# Patient Record
Sex: FEMALE | Race: WHITE | NOT HISPANIC OR LATINO | URBAN - METROPOLITAN AREA
[De-identification: names, ages, dates, MRNs, and addresses within clinical notes are randomized per-mention and may not be internally consistent; named-entity substitution may affect disease eponyms.]

---

## 2021-10-23 ENCOUNTER — INPATIENT (INPATIENT)
Facility: HOSPITAL | Age: 71
LOS: 1 days | Discharge: HOME | End: 2021-10-25
Payer: MEDICARE

## 2021-10-23 PROCEDURE — 72125 CT NECK SPINE W/O DYE: CPT | Mod: 26

## 2021-10-23 PROCEDURE — 93010 ELECTROCARDIOGRAM REPORT: CPT

## 2021-10-23 PROCEDURE — 99285 EMERGENCY DEPT VISIT HI MDM: CPT

## 2021-10-23 PROCEDURE — 71045 X-RAY EXAM CHEST 1 VIEW: CPT | Mod: 26

## 2021-10-23 PROCEDURE — 70450 CT HEAD/BRAIN W/O DYE: CPT | Mod: 26

## 2021-10-23 PROCEDURE — 71260 CT THORAX DX C+: CPT | Mod: 26

## 2021-10-23 PROCEDURE — 74177 CT ABD & PELVIS W/CONTRAST: CPT | Mod: 26

## 2021-10-24 PROCEDURE — 99223 1ST HOSP IP/OBS HIGH 75: CPT

## 2021-10-24 PROCEDURE — 70450 CT HEAD/BRAIN W/O DYE: CPT | Mod: 26

## 2021-10-25 PROCEDURE — 99233 SBSQ HOSP IP/OBS HIGH 50: CPT

## 2022-08-29 ENCOUNTER — APPOINTMENT (OUTPATIENT)
Dept: ORTHOPEDIC SURGERY | Facility: CLINIC | Age: 72
End: 2022-08-29

## 2022-08-29 VITALS — HEIGHT: 64 IN | BODY MASS INDEX: 39.27 KG/M2 | WEIGHT: 230 LBS

## 2022-08-29 DIAGNOSIS — I10 ESSENTIAL (PRIMARY) HYPERTENSION: ICD-10-CM

## 2022-08-29 DIAGNOSIS — Z78.9 OTHER SPECIFIED HEALTH STATUS: ICD-10-CM

## 2022-08-29 DIAGNOSIS — M18.9 OSTEOARTHRITIS OF FIRST CARPOMETACARPAL JOINT, UNSPECIFIED: ICD-10-CM

## 2022-08-29 DIAGNOSIS — E78.00 PURE HYPERCHOLESTEROLEMIA, UNSPECIFIED: ICD-10-CM

## 2022-08-29 DIAGNOSIS — E11.9 TYPE 2 DIABETES MELLITUS W/OUT COMPLICATIONS: ICD-10-CM

## 2022-08-29 PROCEDURE — 73110 X-RAY EXAM OF WRIST: CPT | Mod: LT

## 2022-08-29 PROCEDURE — 99203 OFFICE O/P NEW LOW 30 MIN: CPT

## 2022-08-29 RX ORDER — SIMVASTATIN 80 MG/1
TABLET, FILM COATED ORAL
Refills: 0 | Status: ACTIVE | COMMUNITY

## 2022-08-29 RX ORDER — APIXABAN 5 MG/1
TABLET, FILM COATED ORAL
Refills: 0 | Status: ACTIVE | COMMUNITY

## 2022-08-29 RX ORDER — SITAGLIPTIN 100 MG/1
TABLET, FILM COATED ORAL
Refills: 0 | Status: ACTIVE | COMMUNITY

## 2022-08-29 RX ORDER — CYANOCOBALAMIN/FOLIC AC/VIT B6 1-2.5-25MG
TABLET ORAL
Refills: 0 | Status: ACTIVE | COMMUNITY

## 2022-08-29 RX ORDER — LOSARTAN POTASSIUM 100 MG/1
TABLET, FILM COATED ORAL
Refills: 0 | Status: ACTIVE | COMMUNITY

## 2022-08-29 RX ORDER — RANITIDINE HYDROCHLORIDE 300 MG/1
CAPSULE ORAL
Refills: 0 | Status: ACTIVE | COMMUNITY

## 2022-08-29 RX ORDER — EMPAGLIFLOZIN 25 MG/1
TABLET, FILM COATED ORAL
Refills: 0 | Status: ACTIVE | COMMUNITY

## 2022-08-29 RX ORDER — OXYBUTYNIN CHLORIDE 2.5 MG/1
TABLET ORAL
Refills: 0 | Status: ACTIVE | COMMUNITY

## 2022-08-29 RX ORDER — ICOSAPENT ETHYL 500 MG/1
CAPSULE ORAL
Refills: 0 | Status: ACTIVE | COMMUNITY

## 2022-08-29 NOTE — IMAGING
[de-identified] : Left hand with no swelling, erythema. Able to make fist, oppose thumb to small finger with good thenar bulk, no intrinsic atrophy, - tenderness at radial styloid or A1 pulley. +grind. Stable thumb MCP with tenderness at thumb CMC. Sensation intact throughout. <2sec cap refill.\par \par Left wrist radiographs demonstrate Stage II thumb arthritis.

## 2022-08-29 NOTE — ASSESSMENT
[FreeTextEntry1] : Left thumb CMC arthritis - reviewed radiographs. Discussed treatment ladder including NSAIDs, bracing (shown MetaGrip), hand therapy, CSI and basal joint arthroplasty.\par \par F/u prn (for CSI)

## 2022-08-29 NOTE — HISTORY OF PRESENT ILLNESS
[de-identified] : 71F, RHD, PMHX of DM II, Blood Disorder/Mutation, HTN, HLD presents with left wrist pain with certain ROM. Denies injury/trauma. Denies numbness/tingling. Denies outside imaging/treatment. Admits to pain, for approx. 6 months. Sometimes hears a crack intermittently

## 2023-01-26 ENCOUNTER — OFFICE (OUTPATIENT)
Dept: URBAN - METROPOLITAN AREA CLINIC 103 | Facility: CLINIC | Age: 73
Setting detail: OPHTHALMOLOGY
End: 2023-01-26
Payer: MEDICARE

## 2023-01-26 DIAGNOSIS — H35.033: ICD-10-CM

## 2023-01-26 DIAGNOSIS — H31.101: ICD-10-CM

## 2023-01-26 DIAGNOSIS — H16.223: ICD-10-CM

## 2023-01-26 DIAGNOSIS — H43.812: ICD-10-CM

## 2023-01-26 DIAGNOSIS — E11.9: ICD-10-CM

## 2023-01-26 DIAGNOSIS — H52.7: ICD-10-CM

## 2023-01-26 PROCEDURE — 92014 COMPRE OPH EXAM EST PT 1/>: CPT | Performed by: OPHTHALMOLOGY

## 2023-01-26 PROCEDURE — 92250 FUNDUS PHOTOGRAPHY W/I&R: CPT | Performed by: OPHTHALMOLOGY

## 2023-01-26 PROCEDURE — 92015 DETERMINE REFRACTIVE STATE: CPT | Performed by: OPHTHALMOLOGY

## 2023-01-26 ASSESSMENT — REFRACTION_MANIFEST
OD_VA2: 20/20(J1+)
OS_ADD: +2.50
OD_VA1: 20/30-1
OD_SPHERE: +0.50
OS_AXIS: 089
OD_AXIS: 075
OD_CYLINDER: -0.50
OS_VA1: 20/25
OD_SPHERE: +0.75
OD_AXIS: 090
OS_SPHERE: +1.00
OS_CYLINDER: -1.25
OS_VA2: 20/20(J1+)
OS_SPHERE: +1.25
OD_ADD: +2.25
OS_ADD: +2.25
OD_ADD: +2.50
OD_CYLINDER: -0.75
OS_VA1: 20/25
OS_CYLINDER: -0.75
OD_VA1: 20/30
OS_AXIS: 080

## 2023-01-26 ASSESSMENT — REFRACTION_CURRENTRX
OS_OVR_VA: 20/
OD_OVR_VA: 20/
OS_ADD: +2.50
OD_VPRISM_DIRECTION: PROGS
OS_CYLINDER: -1.25
OD_ADD: +2.50
OD_AXIS: 071
OD_CYLINDER: -0.75
OS_AXIS: 083
OS_VPRISM_DIRECTION: PROGS
OS_SPHERE: +1.25
OD_SPHERE: +0.50

## 2023-01-26 ASSESSMENT — KERATOMETRY
OD_K2POWER_DIOPTERS: 41.75
OD_AXISANGLE_DEGREES: 153
OD_K1POWER_DIOPTERS: 41.25
OS_K2POWER_DIOPTERS: 42.50
OS_K1POWER_DIOPTERS: 41.75
OS_AXISANGLE_DEGREES: 103

## 2023-01-26 ASSESSMENT — AXIALLENGTH_DERIVED
OS_AL: 23.8564
OD_AL: 24.2468
OD_AL: 24.2984
OD_AL: 24.1444

## 2023-01-26 ASSESSMENT — SUPERFICIAL PUNCTATE KERATITIS (SPK)
OD_SPK: T
OS_SPK: T

## 2023-01-26 ASSESSMENT — SPHEQUIV_DERIVED
OS_SPHEQUIV: 0.625
OD_SPHEQUIV: 0.5
OD_SPHEQUIV: 0.125
OD_SPHEQUIV: 0.25
OS_SPHEQUIV: 0.625
OS_SPHEQUIV: 0.625

## 2023-01-26 ASSESSMENT — VISUAL ACUITY
OS_BCVA: 20/30
OD_BCVA: 20/25-2

## 2023-01-26 ASSESSMENT — REFRACTION_AUTOREFRACTION
OD_CYLINDER: -0.50
OS_CYLINDER: -0.75
OD_SPHERE: +0.50
OS_SPHERE: +1.00
OD_AXIS: 084
OS_AXIS: 079

## 2023-01-26 ASSESSMENT — TONOMETRY
OS_IOP_MMHG: 16
OD_IOP_MMHG: 15

## 2023-01-26 ASSESSMENT — CONFRONTATIONAL VISUAL FIELD TEST (CVF)
OD_FINDINGS: FULL
OS_FINDINGS: FULL

## 2023-05-02 PROBLEM — H44.23 MYOPIC DEGENERATION; BOTH EYES: Status: ACTIVE | Noted: 2023-05-02

## 2023-06-23 ENCOUNTER — APPOINTMENT (OUTPATIENT)
Dept: ORTHOPEDIC SURGERY | Facility: CLINIC | Age: 73
End: 2023-06-23
Payer: MEDICARE

## 2023-06-23 VITALS — HEIGHT: 64 IN | WEIGHT: 230 LBS | BODY MASS INDEX: 39.27 KG/M2

## 2023-06-23 PROCEDURE — 99214 OFFICE O/P EST MOD 30 MIN: CPT | Mod: 25

## 2023-06-23 PROCEDURE — 20610 DRAIN/INJ JOINT/BURSA W/O US: CPT | Mod: LT

## 2023-06-23 PROCEDURE — 73030 X-RAY EXAM OF SHOULDER: CPT | Mod: LT

## 2023-06-23 NOTE — DISCUSSION/SUMMARY
[de-identified] : (Assessment and Plan)\par \par History, clinical examination and imaging were most consistent with:\par -left proximal humerus fracture with malunion\par \par The diagnosis was explained in detail. The potential non-surgical and surgical treatments were reviewed. The relative risks and benefits of each option were considered relative to the patient’s age, activity level, medical history, symptom severity and previously attempted treatments. \par \par The patient was advised to consult with their primary medical provider prior to initiation of any new medications to reduce the risk of adverse effects specific to their long-term home medications and medical history. The risk of gastrointestinal irritation and kidney injury specific to long-term NSAID use was discussed.   \par \par -Patient will continue with formal PT as it has been helping. \par -Cortisone injection performed today for symptom relief.\par -Over the counter tylenol as needed for pain. \par -Discussed the only definitive treatment for this problem is a shoulder replacemnt. patient would like to avoid surgery if possible. \par -Follow up in 2 months. Consider additional PT versus surgical discussion based on symptom progression. \par \par \par (MDM) \par \par Problem Complexity \par -Moderate: acute, complicated injury\par \par Risk\par -Moderate: injection\par \par -Patient has not been seen by another provider in my practice within the past 2 years who specializes in orthopedic sports medicine, shoulder or elbow surgery. \par \par (Procedure Note)\par \par Injection location was the:\par -left glenohumeral joint and subacromial bursa\par \par Injection contents were: \par 40 mg of kenalog and 5 mL of 1% lidocaine\par \par Procedure Details: \par -Informed consent was obtained. Discussed possible risks of corticosteroid injection including hyperglycemia, infection and skin discoloration. \par -Discussed that due to infection risk, an interval between injection and any future surgery is 6 weeks for arthroscopy and 3 months for arthroplasty.\par -Injection performed using aseptic technique. Hemostasis was confirmed.\par -Procedure was tolerated well with no complications. \par \par

## 2023-06-23 NOTE — IMAGING
[de-identified] : (Physical Exam: Shoulder)\par \par Laterality is left\par \par Patient is in no acute distress, alert and oriented\par Sensation is grossly intact to light touch in the hand\par Motor function is 5/5 in the hand\par Capillary refill is less than 2 seconds in the fingers\par Lymphadenopathy is not present\par Peripheral edema is not present\par \par Skin is intact \par Swelling is not present \par Atrophy is not present\par Scapular winging is not present\par Deformity of the AC joint is not present\par Deformity of the biceps is not present \par \par Bicipital groove tenderness is not present \par AC joint tenderness is not present \par Scapulothoracic tenderness is not present \par Cervical paraspinal tenderness is not present \par \par Active forward elevation is 80\par Passive forward elevation is 110\par External rotation at the side is 30\par Internal rotation behind the back is to the level of side pocket\par \par Forward elevation strength is 4/5\par External rotation strength at the side is 4/5 \par Internal rotation strength at the side is 4/5 \par Deltoid strength of anterior, posterior and lateral heads is 5/5\par \par Keys test is abnormal \par O’Jose Luis’s test is normal\par Speed’s test is normal\par Cross body adduction test is normal\par Apprehension and relocation is normal\par Sulcus sign is normal\par Belly press test is normal\par Spurling’s test is normal\par  [Left] : left shoulder [FreeTextEntry1] : left proximal humerus malunion with healed fracture, varus alignment of the head fragment, superior migation of greater tuberosity, minimal GH OA, no dislocation

## 2023-06-26 ENCOUNTER — FORM ENCOUNTER (OUTPATIENT)
Age: 73
End: 2023-06-26

## 2023-07-20 ENCOUNTER — FORM ENCOUNTER (OUTPATIENT)
Age: 73
End: 2023-07-20

## 2023-07-27 ENCOUNTER — OFFICE (OUTPATIENT)
Dept: URBAN - METROPOLITAN AREA CLINIC 103 | Facility: CLINIC | Age: 73
Setting detail: OPHTHALMOLOGY
End: 2023-07-27
Payer: MEDICARE

## 2023-07-27 DIAGNOSIS — H52.7: ICD-10-CM

## 2023-07-27 DIAGNOSIS — H16.223: ICD-10-CM

## 2023-07-27 DIAGNOSIS — E11.9: ICD-10-CM

## 2023-07-27 PROCEDURE — 92134 CPTRZ OPH DX IMG PST SGM RTA: CPT | Performed by: OPHTHALMOLOGY

## 2023-07-27 PROCEDURE — 92012 INTRM OPH EXAM EST PATIENT: CPT | Performed by: OPHTHALMOLOGY

## 2023-07-27 PROCEDURE — 92015 DETERMINE REFRACTIVE STATE: CPT | Performed by: OPHTHALMOLOGY

## 2023-07-27 ASSESSMENT — REFRACTION_MANIFEST
OS_ADD: +2.50
OS_AXIS: 089
OS_VA1: 20/20
OD_CYLINDER: -0.75
OS_SPHERE: +1.25
OD_CYLINDER: -0.50
OS_CYLINDER: -1.75
OD_ADD: +2.50
OS_CYLINDER: -0.75
OD_AXIS: 090
OD_SPHERE: +0.75
OS_AXIS: 077
OD_ADD: +2.50
OD_VA1: 20/25
OS_SPHERE: +1.00
OS_VA1: 20/25
OD_AXIS: 090
OD_VA1: 20/30
OD_SPHERE: +0.75
OS_ADD: +2.50

## 2023-07-27 ASSESSMENT — SPHEQUIV_DERIVED
OD_SPHEQUIV: 0.75
OS_SPHEQUIV: 0.625
OD_SPHEQUIV: 0.375
OS_SPHEQUIV: 0.375
OS_SPHEQUIV: 0.875
OD_SPHEQUIV: 0.5

## 2023-07-27 ASSESSMENT — AXIALLENGTH_DERIVED
OS_AL: 24.0515
OD_AL: 24.0009
OS_AL: 23.8507
OD_AL: 23.9006
OD_AL: 24.0515
OS_AL: 23.9507

## 2023-07-27 ASSESSMENT — REFRACTION_CURRENTRX
OS_SPHERE: +1.25
OD_VPRISM_DIRECTION: PROGS
OS_VPRISM_DIRECTION: PROGS
OD_SPHERE: +0.50
OS_ADD: +2.00
OD_AXIS: 076
OS_AXIS: 084
OD_ADD: +2.00
OD_OVR_VA: 20/
OD_CYLINDER: -0.75
OS_CYLINDER: -1.25
OS_OVR_VA: 20/

## 2023-07-27 ASSESSMENT — REFRACTION_AUTOREFRACTION
OS_CYLINDER: -1.75
OD_AXIS: 095
OS_AXIS: 088
OD_CYLINDER: -1.00
OS_SPHERE: +1.75
OD_SPHERE: +1.25

## 2023-07-27 ASSESSMENT — KERATOMETRY
OS_K2POWER_DIOPTERS: 42.00
OD_K2POWER_DIOPTERS: 42.00
OS_K1POWER_DIOPTERS: 41.75
OD_AXISANGLE_DEGREES: 134
OD_K1POWER_DIOPTERS: 41.75
OS_AXISANGLE_DEGREES: 179

## 2023-07-27 ASSESSMENT — CONFRONTATIONAL VISUAL FIELD TEST (CVF)
OD_FINDINGS: FULL
OS_FINDINGS: FULL

## 2023-07-27 ASSESSMENT — VISUAL ACUITY
OS_BCVA: 20/30-
OD_BCVA: 20/25-2

## 2023-07-27 ASSESSMENT — SUPERFICIAL PUNCTATE KERATITIS (SPK)
OS_SPK: 1+
OD_SPK: 1+

## 2023-07-27 ASSESSMENT — TONOMETRY
OD_IOP_MMHG: 15
OS_IOP_MMHG: 11

## 2023-08-18 ENCOUNTER — APPOINTMENT (OUTPATIENT)
Dept: ORTHOPEDIC SURGERY | Facility: CLINIC | Age: 73
End: 2023-08-18
Payer: MEDICARE

## 2023-08-18 DIAGNOSIS — S42.202P UNSPECIFIED FRACTURE OF UPPER END OF LEFT HUMERUS, SUBSEQUENT ENCOUNTER FOR FRACTURE WITH MALUNION: ICD-10-CM

## 2023-08-18 PROCEDURE — 99213 OFFICE O/P EST LOW 20 MIN: CPT

## 2023-08-18 NOTE — IMAGING
[Left] : left shoulder [de-identified] : (Physical Exam: Shoulder)  Laterality is left  Patient is in no acute distress, alert and oriented Sensation is grossly intact to light touch in the hand Motor function is 5/5 in the hand Capillary refill is less than 2 seconds in the fingers Lymphadenopathy is not present Peripheral edema is not present  Skin is intact  Swelling is not present  Atrophy is not present Scapular winging is not present Deformity of the AC joint is not present Deformity of the biceps is not present   Bicipital groove tenderness is not present  AC joint tenderness is not present  Scapulothoracic tenderness is not present  Cervical paraspinal tenderness is not present   Active forward elevation is 100 Passive forward elevation is 130 External rotation at the side is 30 Internal rotation behind the back is to the level of sacrum  Forward elevation strength is 5-/5 External rotation strength at the side is 5-/5  Internal rotation strength at the side is 5-/5  Deltoid strength of anterior, posterior and lateral heads is 5/5  Keys test is abnormal  Canadian's test is normal Speed's test is normal Cross body adduction test is normal Apprehension and relocation is normal Sulcus sign is normal Belly press test is normal Spurling's test is normal  [FreeTextEntry1] : left proximal humerus malunion with healed fracture, varus alignment of the head fragment, superior migation of greater tuberosity, minimal GH OA, no dislocation

## 2023-08-18 NOTE — HISTORY OF PRESENT ILLNESS
[de-identified] : (History of Present Illness) Patient age in years is 72  Occupation is  retired   Body part causing symptoms is the LT shoulder  Symptoms Feb 17th 2023 when she fell and sustained a proximal humerus fracture She was seen by an orthopedist at [facility], treated non-surgically She has been treated with PT She reports gradual improvement with therapy Location of pain is lateral  Quality of pain is  dull  Pain score at rest is  4/10 Pain score during activity is  8/10 Radicular symptoms are not present  Patient's condition is not associated with worker's compensation, no-fault or interscholastic athletics   8/18 f/u: Patient reports she has been improving with PT. She states the CSI helped partially with the pain. She would like to continue with PT.

## 2023-08-18 NOTE — DISCUSSION/SUMMARY
[de-identified] : (Assessment and Plan)  History, clinical examination and imaging were most consistent with: -left proximal humerus fracture with malunion  The diagnosis was explained in detail. The potential non-surgical and surgical treatments were reviewed. The relative risks and benefits of each option were considered relative to the patient's age, activity level, medical history, symptom severity and previously attempted treatments.   The patient was advised to consult with their primary medical provider prior to initiation of any new medications to reduce the risk of adverse effects specific to their long-term home medications and medical history. The risk of gastrointestinal irritation and kidney injury specific to long-term NSAID use was discussed.     -Patient has been improving with non-surgical treatment and would like to continue with PT. -Over the counter tylenol as needed for pain.  -Discussed the only definitive treatment for this problem is a shoulder replacemnt. patient would like to avoid surgery if possible due to other medical problems including blood clots and diabetes, as well as cane use.  -Follow up as needed, consider repeat injection if pain worsens.    (MDM)   Problem Complexity  -Moderate: acute, complicated injury

## 2023-08-24 ENCOUNTER — APPOINTMENT (OUTPATIENT)
Dept: ORTHOPEDIC SURGERY | Facility: CLINIC | Age: 73
End: 2023-08-24
Payer: MEDICARE

## 2023-08-24 VITALS — WEIGHT: 230 LBS | BODY MASS INDEX: 39.27 KG/M2 | HEIGHT: 64 IN

## 2023-08-24 PROCEDURE — 99204 OFFICE O/P NEW MOD 45 MIN: CPT | Mod: 25

## 2023-08-24 PROCEDURE — 20610 DRAIN/INJ JOINT/BURSA W/O US: CPT | Mod: RT

## 2023-08-24 PROCEDURE — 73564 X-RAY EXAM KNEE 4 OR MORE: CPT | Mod: RT

## 2023-08-24 PROCEDURE — 99214 OFFICE O/P EST MOD 30 MIN: CPT | Mod: 25

## 2023-08-24 NOTE — PHYSICAL EXAM
[Right] : right knee [5___] : hamstring 5[unfilled]/5 [] : ambulation with cane [FreeTextEntry3] : decreasing sensation stocking dis of foot  [de-identified] : seen dr. casarez for LSP  [TWNoteComboBox7] : flexion 100 degrees [de-identified] : extension 0 degrees

## 2023-08-24 NOTE — HISTORY OF PRESENT ILLNESS
[de-identified] : Date of Injury/Onset: chronic, years of issues      Pain: At Rest:  0/10    With Activity:  7/10  Affecting Sleep: Cramp when moving in her bed Difficulty with stairs: yes Difficulty getting in and out of car: yes Sit to stand stiffness: yes Mechanism of injury: No known injury, chronic pain This is not a Work-Related Injury being treated under Worker's Compensation. This is not  an athletic injury occurring associated with an interscholastic or organized sports team. Quality of symptoms: dull Improves with: rest, sitting down    Worse with: weight bearing Previous Treatment/Imaging/Studies Since Last Visit: none Reports Available For Review Today: none Out of work/sport:  Retired School/Sport/Position/Occupation: Retired  Reports difficulty bearing weight. yes Partial tear of meniscus and Hx of Cortisone shots in her right knee. Patient feels like her knee will "give out" on her. Currently ambulating with a cane

## 2023-08-24 NOTE — REASON FOR VISIT
[FreeTextEntry2] : Patient here for dull right knee pain, associated with some swelling. Currently ambulating with cane.

## 2023-08-24 NOTE — DISCUSSION/SUMMARY
[de-identified] : Large joint injection was performed of the right knee . Injection # 1    The indication for this procedure was pain and inflammation. The site was prepped with betadine and sterile technique used. Patient tolerated procedure well. Patient was advised to call if redness, pain or fever occur, apply ice for 15 minutes out of every hour for the next 12-24 hours as tolerated and patient was advised to rest the joint(s) for 1 days.    Injection:  Glesyn Series 16.8mg/ml injection     Patient has tried OTC's including aspirin, Ibuprofen, Aleve, etc or prescription NSAIDS, and/or exercises at home and/or physical therapy without satisfactory response, patient had decreased mobility in the joint and the risks benefits, and alternatives have been discussed, and verbal consent was obtained.    Patient will f/u 1 week for next injection

## 2023-08-24 NOTE — PROCEDURE
[Large Joint Injection] : Large joint injection [Knee] : knee [Pain] : pain [Inflammation] : inflammation [Betadine] : betadine [Gel-Syn (16.8mg)] : 16.8mg of Gel-Syn [#1] : series #1

## 2023-08-30 ENCOUNTER — APPOINTMENT (OUTPATIENT)
Dept: ORTHOPEDIC SURGERY | Facility: CLINIC | Age: 73
End: 2023-08-30
Payer: MEDICARE

## 2023-08-30 PROCEDURE — 99213 OFFICE O/P EST LOW 20 MIN: CPT | Mod: 25

## 2023-08-30 PROCEDURE — 72100 X-RAY EXAM L-S SPINE 2/3 VWS: CPT

## 2023-08-30 NOTE — PHYSICAL EXAM
[TextEntry] : Constitutional: Well groomed and developed.  Respiratory: Normal, unlabored breathing. No use of accessory muscles.  Skin: No rashes or ulcers. Skin warm and dry.  Psychiatric: Oriented to time, place, person and event. No acute distress. Gait: Heel to toe Patient able to walk on toes and heels.    Lumbar spine:  Posture: Normal on coronal and sagittal alignment  AROM:  Flexion 60  Extension 10  Moderate pain with simulated truncal motion   Tenderness:  Thoracic: No tenderness on palpation  Lumbar: Moderate tenderness on palpation   TTP L4 verterbrae  Sacrum/coccyx: no tenderness on palpation  Greater trochanteric bursa:  No tenderness  PSIS: None    Motor:                         R             L LE:                                IS                    5             5 Quad              5             5 TA                  5             5 EHL                5             5 Gastroc         5             5                 R  L DTR: Patella  2+  2+ Achilles  2+  2+  Sensory: Light touch sensation intact T12-S1  Babinski's Sign: Negative bilaterally  Straight leg raise test: Negative bilaterally  LIOR test: negative bilaterally

## 2023-08-30 NOTE — ASSESSMENT
[FreeTextEntry1] : Patient given prescription for [facility] MRI, follow up after study is completed to discuss results.  Recommend: - NSAID - Heating pad - Muscle relaxer - Core strengthening exercise - Hamstring stretching exercise Patient is given back rehabilitation exercise book.  Patient will begin physical therapy.

## 2023-08-30 NOTE — REASON FOR VISIT
[FreeTextEntry2] : Lower back pain ongoing for years- states she fell after an episode of syncope 10/2022

## 2023-08-30 NOTE — HISTORY OF PRESENT ILLNESS
[de-identified] : Patient presents today with lower back pain ongoing for years Pt states she fell after an episode of syncope 10/2022- had MRI per hematologist of L spine at [facility] (?)  Pt reports intermittent pain in middle and bilat sides of back- worse when bending and rising to a standing position.  Pt reports weakness in bilat legs- worse in the right and worse with prolonged sitting.  Denies pain, numbness or tingling in LEs.  Reports some relief with Tylenol.  Pt reports seeing Dr Guzman for PM- had a procedure~2/2023 with improvement- reports pain was constant and is now intermittent.

## 2023-09-01 ENCOUNTER — APPOINTMENT (OUTPATIENT)
Dept: ORTHOPEDIC SURGERY | Facility: CLINIC | Age: 73
End: 2023-09-01
Payer: MEDICARE

## 2023-09-01 PROCEDURE — 20610 DRAIN/INJ JOINT/BURSA W/O US: CPT | Mod: RT

## 2023-09-01 NOTE — PHYSICAL EXAM
[Right] : right knee [5___] : hamstring 5[unfilled]/5 [] : ambulation with cane [FreeTextEntry3] : decreasing sensation stocking dis of foot  [de-identified] : seen dr. casarez for LSP  [TWNoteComboBox7] : flexion 100 degrees [de-identified] : extension 0 degrees

## 2023-09-01 NOTE — DISCUSSION/SUMMARY
[de-identified] : Patient here for Gelsyn #2. States minor improvement with first shot.  Follow up 1 week for Gelsyn #3.

## 2023-09-01 NOTE — PROCEDURE
[Large Joint Injection] : Large joint injection [Knee] : knee [Pain] : pain [Inflammation] : inflammation [Betadine] : betadine [Gel-Syn (16.8mg)] : 16.8mg of Gel-Syn [#2] : series #2

## 2023-09-08 ENCOUNTER — APPOINTMENT (OUTPATIENT)
Dept: ORTHOPEDIC SURGERY | Facility: CLINIC | Age: 73
End: 2023-09-08
Payer: MEDICARE

## 2023-09-08 PROCEDURE — 20610 DRAIN/INJ JOINT/BURSA W/O US: CPT | Mod: RT

## 2023-09-08 NOTE — DISCUSSION/SUMMARY
[de-identified] : Large joint injection was performed bilaterally of the knee. The indication for this procedure was pain, inflammation  of Osteoarthritis on this or prior visit. The site was prepped with betadine, ethyl chloride sprayed topically and sterile technique used. An injection of 16.8mg of Gel-Syn, series #3 was used.  Patient tolerated procedure well. Patient was advised to call if redness, pain or fever occur and apply ice for 15 minutes out of every hour for the next 12-24 hours as tolerated.    Patient has tried OTC's including aspirin, Ibuprofen, Aleve, etc or prescription NSAIDS, and/or exercises at home and/or physical therapy without satisfactory response, patient had decreased mobility in the joint and the risks benefits, and alternatives have been discussed, and verbal consent was obtained.  f/u PRN

## 2023-09-08 NOTE — PHYSICAL EXAM
[Right] : right knee [5___] : hamstring 5[unfilled]/5 [] : ambulation with cane [FreeTextEntry3] : decreasing sensation stocking dis of foot  [de-identified] : seen dr. casarez for LSP  [TWNoteComboBox7] : flexion 100 degrees [de-identified] : extension 0 degrees

## 2023-09-08 NOTE — PROCEDURE
[Large Joint Injection] : Large joint injection [Knee] : knee [Pain] : pain [Inflammation] : inflammation [Betadine] : betadine [Gel-Syn (16.8mg)] : 16.8mg of Gel-Syn [#3] : series #3

## 2023-09-09 ENCOUNTER — RESULT REVIEW (OUTPATIENT)
Age: 73
End: 2023-09-09

## 2023-09-22 ENCOUNTER — APPOINTMENT (OUTPATIENT)
Dept: ORTHOPEDIC SURGERY | Facility: CLINIC | Age: 73
End: 2023-09-22
Payer: MEDICARE

## 2023-09-22 DIAGNOSIS — M54.16 RADICULOPATHY, LUMBAR REGION: ICD-10-CM

## 2023-09-22 DIAGNOSIS — M51.37 OTHER INTERVERTEBRAL DISC DEGENERATION, LUMBOSACRAL REGION: ICD-10-CM

## 2023-09-22 DIAGNOSIS — M51.26 OTHER INTERVERTEBRAL DISC DISPLACEMENT, LUMBAR REGION: ICD-10-CM

## 2023-09-22 DIAGNOSIS — M47.817 SPONDYLOSIS W/OUT MYELOPATHY OR RADICULOPATHY, LUMBOSACRAL REGION: ICD-10-CM

## 2023-09-22 DIAGNOSIS — M51.36 OTHER INTERVERTEBRAL DISC DEGENERATION, LUMBAR REGION: ICD-10-CM

## 2023-09-22 DIAGNOSIS — S32.040A WEDGE COMPRESSION FRACTURE OF FOURTH LUMBAR VERTEBRA, INITIAL ENCOUNTER FOR CLOSED FRACTURE: ICD-10-CM

## 2023-09-22 PROCEDURE — 99214 OFFICE O/P EST MOD 30 MIN: CPT

## 2023-11-16 ENCOUNTER — APPOINTMENT (OUTPATIENT)
Dept: ORTHOPEDIC SURGERY | Facility: CLINIC | Age: 73
End: 2023-11-16

## 2023-11-28 PROBLEM — H26.491 POSTERIOR CAPSULAR OPACIFICATION; RIGHT EYE: Status: ACTIVE | Noted: 2023-11-28

## 2024-01-31 ENCOUNTER — OFFICE (OUTPATIENT)
Dept: URBAN - METROPOLITAN AREA CLINIC 103 | Facility: CLINIC | Age: 74
Setting detail: OPHTHALMOLOGY
End: 2024-01-31
Payer: MEDICARE

## 2024-01-31 DIAGNOSIS — H43.812: ICD-10-CM

## 2024-01-31 DIAGNOSIS — H35.3111: ICD-10-CM

## 2024-01-31 DIAGNOSIS — H02.834: ICD-10-CM

## 2024-01-31 DIAGNOSIS — H31.101: ICD-10-CM

## 2024-01-31 DIAGNOSIS — H16.223: ICD-10-CM

## 2024-01-31 DIAGNOSIS — H35.033: ICD-10-CM

## 2024-01-31 DIAGNOSIS — E11.3293: ICD-10-CM

## 2024-01-31 DIAGNOSIS — H43.393: ICD-10-CM

## 2024-01-31 DIAGNOSIS — H02.831: ICD-10-CM

## 2024-01-31 PROCEDURE — 92014 COMPRE OPH EXAM EST PT 1/>: CPT | Performed by: OPHTHALMOLOGY

## 2024-01-31 PROCEDURE — 92250 FUNDUS PHOTOGRAPHY W/I&R: CPT | Performed by: OPHTHALMOLOGY

## 2024-01-31 ASSESSMENT — REFRACTION_MANIFEST
OS_SPHERE: +1.00
OS_ADD: +2.50
OS_VA1: 20/20
OD_VA1: 20/30
OS_CYLINDER: -0.75
OS_CYLINDER: -1.75
OS_VA1: 20/25
OD_AXIS: 090
OD_CYLINDER: -0.75
OD_ADD: +2.50
OD_ADD: +2.50
OD_SPHERE: +0.75
OS_AXIS: 077
OS_ADD: +2.50
OD_CYLINDER: -0.50
OD_VA1: 20/25
OS_SPHERE: +1.25
OS_AXIS: 089
OD_AXIS: 090
OD_SPHERE: +0.75

## 2024-01-31 ASSESSMENT — LID POSITION - DERMATOCHALASIS
OD_DERMATOCHALASIS: RUL 2+ 3+
OS_DERMATOCHALASIS: LUL 1+

## 2024-01-31 ASSESSMENT — REFRACTION_CURRENTRX
OS_SPHERE: +1.25
OD_VPRISM_DIRECTION: PROGS
OS_ADD: +2.00
OD_OVR_VA: 20/
OD_SPHERE: +0.50
OD_CYLINDER: -0.75
OS_CYLINDER: -1.25
OD_AXIS: 076
OD_ADD: +2.00
OS_OVR_VA: 20/
OS_AXIS: 084
OS_VPRISM_DIRECTION: PROGS

## 2024-01-31 ASSESSMENT — CONFRONTATIONAL VISUAL FIELD TEST (CVF)
OD_FINDINGS: FULL
OS_FINDINGS: FULL

## 2024-01-31 ASSESSMENT — SPHEQUIV_DERIVED
OS_SPHEQUIV: 0.625
OD_SPHEQUIV: 0.375
OD_SPHEQUIV: 0.5
OS_SPHEQUIV: 0.375

## 2024-05-21 PROBLEM — H26.491 POSTERIOR CAPSULAR OPACIFICATION; RIGHT EYE: Status: ACTIVE | Noted: 2024-05-21

## 2024-05-23 ENCOUNTER — APPOINTMENT (OUTPATIENT)
Dept: ORTHOPEDIC SURGERY | Facility: CLINIC | Age: 74
End: 2024-05-23
Payer: MEDICARE

## 2024-05-23 VITALS — BODY MASS INDEX: 39.27 KG/M2 | HEIGHT: 64 IN | WEIGHT: 230 LBS

## 2024-05-23 DIAGNOSIS — M48.061 SPINAL STENOSIS, LUMBAR REGION WITHOUT NEUROGENIC CLAUDICATION: ICD-10-CM

## 2024-05-23 PROCEDURE — 99214 OFFICE O/P EST MOD 30 MIN: CPT | Mod: 25

## 2024-05-23 PROCEDURE — 20610 DRAIN/INJ JOINT/BURSA W/O US: CPT | Mod: RT

## 2024-05-23 NOTE — REASON FOR VISIT
[FreeTextEntry2] : f/u for right knee. Gel inj lasted for 5 months. Having clicked, anterior/posterior sharp pain. Ambulating with a cane. Not taking pain medication. 20 years ago Dx with meniscus tear CSI and PT did OK night pain, clicking and slipping instability feeling. going to [facility] rehab 2 stents placed since last visit spinal stenosis Dr Elizabeth left shoulder fracture dislocation  Dr Tracey

## 2024-05-23 NOTE — DISCUSSION/SUMMARY
[de-identified] : Lengthy discussion regarding options was had with the patient. Nonsurgical options including but not limited to cortisone, Visco supplementation, Prescription anti-inflammatory medications (both steroidal and non-steroidal), activity modification, non-impact exercise, maintaining a healthy BMI, bracing, and icing were reviewed. Surgical options including but not limited to arthroscopy, and joint replacement were discussed as was risks, benefits and alternatives.  The patient was advised of the diagnosis. The natural history of the pathology was explained in full to the patient in layman's terms. Several different treatment options were discussed and explained in full to the patient including the risks and benefits of both surgical and non-surgical treatments. All questions and concerns were answered.  I spent time going in detail the problem and the associated risks/benefits of surgery. Went into detailed discussion of complications including but not limited to, nerve injury, non-union, repeat fracture, DVT /PE /pe postop, instability, transfusion, infection, NVA injury, stiffness, leg length discrepancy, inability to ambulate & death. Discussed implant and model shown to patient. Patient had ample time to ask any questions, and at this time answered all patient questions, should anymore arise they were instructed to follow up. Patient expressed understanding of the proposed procedure and postop directions. Patient has failed non-surgical treatment and PT is contraindicated at this time.  At this time due to patients recent stent placement she is not a surgical candidate. Plan at this time is to go forward with PT and to begin the gel injection series. Patient is currently on blood thinners due to recent heart surgery, therfore cannot use perscription NSAIDS. Patient reports some mild instability. Patient also currently being treated for spinal stenosis by Dr. Elizabeth  f/u

## 2024-05-23 NOTE — PHYSICAL EXAM
[5___] : hamstring 5[unfilled]/5 [] : patient ambulates without assistive device [Right] : right knee [All Views] : anteroposterior, lateral, skyline, and anteroposterior standing [de-identified] : Patella tracks laterally [de-identified] : decreased sensation stocking distribution [FreeTextEntry9] : Lateral joint space narrowing. Sclerosis of the lateral knee. Valgus deformity. Severe DJD lateral compartment. Tricompartmental osteophyte formation. No fractures seen. [TWNoteComboBox7] : flexion 120 degrees [de-identified] : extension 0 degrees

## 2024-05-31 ENCOUNTER — APPOINTMENT (OUTPATIENT)
Dept: ORTHOPEDIC SURGERY | Facility: CLINIC | Age: 74
End: 2024-05-31
Payer: MEDICARE

## 2024-05-31 PROCEDURE — 20610 DRAIN/INJ JOINT/BURSA W/O US: CPT | Mod: RT

## 2024-05-31 NOTE — PROCEDURE
[Large Joint Injection] : Large joint injection [Right] : of the right [Knee] : knee [Pain] : pain [Inflammation] : inflammation [X-ray evidence of Osteoarthritis on this or prior visit] : x-ray evidence of Osteoarthritis on this or prior visit [Repeat series performed] : repeat series performed [Betadine] : betadine [Ethyl Chloride sprayed topically] : ethyl chloride sprayed topically [Sterile technique used] : sterile technique used [Gel-Syn (16.8mg)] : 16.8mg of Gel-Syn [#2] : series #2 [] : Patient tolerated procedure well [Call if redness, pain or fever occur] : call if redness, pain or fever occur [Apply ice for 15min out of every hour for the next 12-24 hours as tolerated] : apply ice for 15 minutes out of every hour for the next 12-24 hours as tolerated [Previous OTC use and PT nontherapeutic] : patient has tried OTC's including aspirin, Ibuprofen, Aleve, etc or prescription NSAIDS, and/or exercises at home and/or physical therapy without satisfactory response [Patient had decreased mobility in the joint] : patient had decreased mobility in the joint [Risks, benefits, alternatives discussed / Verbal consent obtained] : the risks benefits, and alternatives have been discussed, and verbal consent was obtained

## 2024-05-31 NOTE — REASON FOR VISIT
[FreeTextEntry2] : here today purvi right knee #2.  states she got some relief of pain with 1st injection

## 2024-05-31 NOTE — PHYSICAL EXAM
Refill passed per [facility], [facility] protocol.    Requested Prescriptions   Pending Prescriptions Disp Refills    LEVOTHYROXINE 75 MCG Oral Tab [Pharmacy Med Name: LEVOTHYROXINE 0.075MG (75MCG) TABS] 30 tablet 11     Sig: TAKE 1 TABLET(75 MCG) BY MOUTH DAILY [5___] : hamstring 5[unfilled]/5 [] : patient ambulates without assistive device [Right] : right knee [All Views] : anteroposterior, lateral, skyline, and anteroposterior standing [de-identified] : Patella tracks laterally [de-identified] : decreased sensation stocking distribution [FreeTextEntry9] : Lateral joint space narrowing. Sclerosis of the lateral knee. Valgus deformity. Severe DJD lateral compartment. Tricompartmental osteophyte formation. No fractures seen. [TWNoteComboBox7] : flexion 120 degrees [de-identified] : extension 0 degrees

## 2024-06-07 ENCOUNTER — APPOINTMENT (OUTPATIENT)
Dept: ORTHOPEDIC SURGERY | Facility: CLINIC | Age: 74
End: 2024-06-07
Payer: MEDICARE

## 2024-06-07 DIAGNOSIS — M17.11 UNILATERAL PRIMARY OSTEOARTHRITIS, RIGHT KNEE: ICD-10-CM

## 2024-06-07 PROCEDURE — 20610 DRAIN/INJ JOINT/BURSA W/O US: CPT | Mod: RT

## 2024-06-07 NOTE — PROCEDURE
[Large Joint Injection] : Large joint injection [Right] : of the right [Knee] : knee [Pain] : pain [Inflammation] : inflammation [X-ray evidence of Osteoarthritis on this or prior visit] : x-ray evidence of Osteoarthritis on this or prior visit [Repeat series performed] : repeat series performed [Betadine] : betadine [Ethyl Chloride sprayed topically] : ethyl chloride sprayed topically [Sterile technique used] : sterile technique used [Gel-Syn (16.8mg)] : 16.8mg of Gel-Syn [#3] : series #3 [] : Patient tolerated procedure well [Call if redness, pain or fever occur] : call if redness, pain or fever occur [Apply ice for 15min out of every hour for the next 12-24 hours as tolerated] : apply ice for 15 minutes out of every hour for the next 12-24 hours as tolerated [Previous OTC use and PT nontherapeutic] : patient has tried OTC's including aspirin, Ibuprofen, Aleve, etc or prescription NSAIDS, and/or exercises at home and/or physical therapy without satisfactory response [Patient had decreased mobility in the joint] : patient had decreased mobility in the joint [Risks, benefits, alternatives discussed / Verbal consent obtained] : the risks benefits, and alternatives have been discussed, and verbal consent was obtained

## 2024-06-07 NOTE — REASON FOR VISIT
[FreeTextEntry2] : here today for purvi #3 right knee.  states she has less pain , but still weak.

## 2024-06-07 NOTE — PHYSICAL EXAM
[5___] : hamstring 5[unfilled]/5 [] : patient ambulates without assistive device [Right] : right knee [All Views] : anteroposterior, lateral, skyline, and anteroposterior standing [de-identified] : Patella tracks laterally [de-identified] : decreased sensation stocking distribution [FreeTextEntry9] : Lateral joint space narrowing. Sclerosis of the lateral knee. Valgus deformity. Severe DJD lateral compartment. Tricompartmental osteophyte formation. No fractures seen. [TWNoteComboBox7] : flexion 120 degrees [de-identified] : extension 0 degrees

## 2024-06-07 NOTE — PHYSICAL EXAM
[5___] : hamstring 5[unfilled]/5 [] : patient ambulates without assistive device [Right] : right knee [All Views] : anteroposterior, lateral, skyline, and anteroposterior standing [de-identified] : Patella tracks laterally [de-identified] : decreased sensation stocking distribution [FreeTextEntry9] : Lateral joint space narrowing. Sclerosis of the lateral knee. Valgus deformity. Severe DJD lateral compartment. Tricompartmental osteophyte formation. No fractures seen. [TWNoteComboBox7] : flexion 120 degrees [de-identified] : extension 0 degrees

## 2024-09-26 ENCOUNTER — OFFICE (OUTPATIENT)
Dept: URBAN - METROPOLITAN AREA CLINIC 103 | Facility: CLINIC | Age: 74
Setting detail: OPHTHALMOLOGY
End: 2024-09-26
Payer: MEDICARE

## 2024-09-26 DIAGNOSIS — H16.223: ICD-10-CM

## 2024-09-26 DIAGNOSIS — H02.834: ICD-10-CM

## 2024-09-26 DIAGNOSIS — H10.433: ICD-10-CM

## 2024-09-26 DIAGNOSIS — H52.7: ICD-10-CM

## 2024-09-26 DIAGNOSIS — E11.3293: ICD-10-CM

## 2024-09-26 DIAGNOSIS — H35.3111: ICD-10-CM

## 2024-09-26 DIAGNOSIS — H02.831: ICD-10-CM

## 2024-09-26 PROCEDURE — 92015 DETERMINE REFRACTIVE STATE: CPT | Performed by: OPHTHALMOLOGY

## 2024-09-26 PROCEDURE — 92014 COMPRE OPH EXAM EST PT 1/>: CPT | Performed by: OPHTHALMOLOGY

## 2024-09-26 PROCEDURE — 92134 CPTRZ OPH DX IMG PST SGM RTA: CPT | Performed by: OPHTHALMOLOGY

## 2024-09-26 ASSESSMENT — LID POSITION - DERMATOCHALASIS
OS_DERMATOCHALASIS: LUL 1+
OD_DERMATOCHALASIS: RUL 2+ 3+

## 2024-12-23 ENCOUNTER — RESULT REVIEW (OUTPATIENT)
Age: 74
End: 2024-12-23

## 2025-01-07 ENCOUNTER — OFFICE (OUTPATIENT)
Dept: URBAN - METROPOLITAN AREA CLINIC 103 | Facility: CLINIC | Age: 75
Setting detail: OPHTHALMOLOGY
End: 2025-01-07
Payer: MEDICARE

## 2025-01-07 DIAGNOSIS — H16.223: ICD-10-CM

## 2025-01-07 DIAGNOSIS — H02.831: ICD-10-CM

## 2025-01-07 DIAGNOSIS — H02.834: ICD-10-CM

## 2025-01-07 DIAGNOSIS — H10.433: ICD-10-CM

## 2025-01-07 DIAGNOSIS — H43.813: ICD-10-CM

## 2025-01-07 PROBLEM — H35.311 AGE RELATED MACULAR DEGENERATION DRY; RIGHT EYE EARLY: Status: ACTIVE | Noted: 2025-01-07

## 2025-01-07 PROCEDURE — 92014 COMPRE OPH EXAM EST PT 1/>: CPT | Performed by: OPHTHALMOLOGY

## 2025-01-07 ASSESSMENT — REFRACTION_CURRENTRX
OS_VPRISM_DIRECTION: PROGS
OS_SPHERE: +1.25
OD_OVR_VA: 20/
OS_ADD: +2.50
OS_SPHERE: +1.25
OS_ADD: +2.50
OS_AXIS: 077
OD_ADD: +2.50
OD_CYLINDER: -0.75
OS_CYLINDER: -1.75
OD_VPRISM_DIRECTION: SV
OD_AXIS: 091
OS_CYLINDER: -1.75
OS_VPRISM_DIRECTION: SV
OD_VPRISM_DIRECTION: PROGS
OD_AXIS: 085
OD_OVR_VA: 20/
OD_ADD: +2.50
OD_SPHERE: +0.75
OS_OVR_VA: 20/
OS_OVR_VA: 20/
OD_CYLINDER: -0.75
OS_AXIS: 077
OD_SPHERE: +0.75

## 2025-01-07 ASSESSMENT — REFRACTION_MANIFEST
OD_ADD: +2.50
OD_CYLINDER: -0.75
OD_SPHERE: +0.50
OS_SPHERE: +1.00
OD_SPHERE: +0.75
OD_CYLINDER: -0.50
OD_ADD: +2.50
OD_VA1: 20/30
OD_AXIS: 080
OS_CYLINDER: -2.00
OS_ADD: +2.50
OS_VA1: 20/25
OS_AXIS: 070
OD_AXIS: 090
OS_AXIS: 089
OS_ADD: +2.50
OS_SPHERE: +1.50
OS_CYLINDER: -0.75

## 2025-01-07 ASSESSMENT — CONFRONTATIONAL VISUAL FIELD TEST (CVF)
OD_FINDINGS: FULL
OS_FINDINGS: FULL

## 2025-01-07 ASSESSMENT — VISUAL ACUITY
OS_BCVA: 20/30-1
OD_BCVA: 20/30-1

## 2025-01-07 ASSESSMENT — REFRACTION_AUTOREFRACTION
OD_CYLINDER: -1.00
OS_CYLINDER: -1.50
OD_AXIS: 096
OD_SPHERE: +0.75
OS_AXIS: 080
OS_SPHERE: +1.25

## 2025-01-07 ASSESSMENT — KERATOMETRY
OS_K1POWER_DIOPTERS: 41.75
OS_K2POWER_DIOPTERS: 41.75
OD_AXISANGLE_DEGREES: 153
OD_K1POWER_DIOPTERS: 42.25
OD_K2POWER_DIOPTERS: 42.75

## 2025-01-07 ASSESSMENT — TONOMETRY: OD_IOP_MMHG: 13

## 2025-01-07 ASSESSMENT — LID POSITION - DERMATOCHALASIS
OS_DERMATOCHALASIS: LUL 1+
OD_DERMATOCHALASIS: RUL 2+ 3+

## 2025-01-07 ASSESSMENT — SUPERFICIAL PUNCTATE KERATITIS (SPK)
OD_SPK: 1+
OS_SPK: 1+

## 2025-01-09 ENCOUNTER — APPOINTMENT (OUTPATIENT)
Dept: ORTHOPEDIC SURGERY | Facility: CLINIC | Age: 75
End: 2025-01-09

## 2025-01-09 VITALS — WEIGHT: 230 LBS | BODY MASS INDEX: 39.27 KG/M2 | HEIGHT: 64 IN

## 2025-01-09 DIAGNOSIS — M17.11 UNILATERAL PRIMARY OSTEOARTHRITIS, RIGHT KNEE: ICD-10-CM

## 2025-01-09 PROCEDURE — 73564 X-RAY EXAM KNEE 4 OR MORE: CPT | Mod: RT

## 2025-01-09 PROCEDURE — 99214 OFFICE O/P EST MOD 30 MIN: CPT

## 2025-01-31 ENCOUNTER — APPOINTMENT (OUTPATIENT)
Dept: ORTHOPEDIC SURGERY | Facility: CLINIC | Age: 75
End: 2025-01-31

## 2025-02-28 ENCOUNTER — APPOINTMENT (OUTPATIENT)
Dept: CARDIOLOGY | Facility: CLINIC | Age: 75
End: 2025-02-28
Payer: MEDICARE

## 2025-02-28 ENCOUNTER — NON-APPOINTMENT (OUTPATIENT)
Age: 75
End: 2025-02-28

## 2025-02-28 VITALS
WEIGHT: 235 LBS | SYSTOLIC BLOOD PRESSURE: 130 MMHG | OXYGEN SATURATION: 98 % | DIASTOLIC BLOOD PRESSURE: 70 MMHG | HEIGHT: 64 IN | BODY MASS INDEX: 40.12 KG/M2 | HEART RATE: 80 BPM

## 2025-02-28 DIAGNOSIS — R55 SYNCOPE AND COLLAPSE: ICD-10-CM

## 2025-02-28 DIAGNOSIS — I25.10 ATHEROSCLEROTIC HEART DISEASE OF NATIVE CORONARY ARTERY W/OUT ANGINA PECTORIS: ICD-10-CM

## 2025-02-28 PROCEDURE — 93000 ELECTROCARDIOGRAM COMPLETE: CPT

## 2025-02-28 PROCEDURE — 99204 OFFICE O/P NEW MOD 45 MIN: CPT | Mod: 25

## 2025-02-28 PROCEDURE — 99214 OFFICE O/P EST MOD 30 MIN: CPT | Mod: 25

## 2025-03-14 ENCOUNTER — APPOINTMENT (OUTPATIENT)
Dept: ORTHOPEDIC SURGERY | Facility: CLINIC | Age: 75
End: 2025-03-14

## 2025-03-21 ENCOUNTER — APPOINTMENT (OUTPATIENT)
Dept: ORTHOPEDIC SURGERY | Facility: CLINIC | Age: 75
End: 2025-03-21

## 2025-03-21 PROCEDURE — 99214 OFFICE O/P EST MOD 30 MIN: CPT | Mod: 25

## 2025-03-21 PROCEDURE — 20610 DRAIN/INJ JOINT/BURSA W/O US: CPT | Mod: RT

## 2025-03-28 ENCOUNTER — APPOINTMENT (OUTPATIENT)
Dept: ORTHOPEDIC SURGERY | Facility: CLINIC | Age: 75
End: 2025-03-28
Payer: MEDICARE

## 2025-03-28 PROCEDURE — 20610 DRAIN/INJ JOINT/BURSA W/O US: CPT | Mod: RT

## 2025-04-04 ENCOUNTER — APPOINTMENT (OUTPATIENT)
Dept: ORTHOPEDIC SURGERY | Facility: CLINIC | Age: 75
End: 2025-04-04
Payer: MEDICARE

## 2025-04-04 DIAGNOSIS — M17.11 UNILATERAL PRIMARY OSTEOARTHRITIS, RIGHT KNEE: ICD-10-CM

## 2025-04-04 PROCEDURE — 20610 DRAIN/INJ JOINT/BURSA W/O US: CPT | Mod: RT

## 2025-05-08 ENCOUNTER — APPOINTMENT (OUTPATIENT)
Dept: CARDIOLOGY | Facility: CLINIC | Age: 75
End: 2025-05-08
Payer: MEDICARE

## 2025-05-08 PROCEDURE — 93016 CV STRESS TEST SUPVJ ONLY: CPT

## 2025-05-08 PROCEDURE — 78452 HT MUSCLE IMAGE SPECT MULT: CPT

## 2025-05-08 PROCEDURE — 93018 CV STRESS TEST I&R ONLY: CPT

## 2025-05-08 PROCEDURE — A9500: CPT

## 2025-05-08 PROCEDURE — 93017 CV STRESS TEST TRACING ONLY: CPT

## 2025-05-15 ENCOUNTER — APPOINTMENT (OUTPATIENT)
Dept: CARDIOLOGY | Facility: CLINIC | Age: 75
End: 2025-05-15

## 2025-05-21 ENCOUNTER — APPOINTMENT (OUTPATIENT)
Facility: CLINIC | Age: 75
End: 2025-05-21
Payer: MEDICARE

## 2025-05-21 VITALS
BODY MASS INDEX: 40.12 KG/M2 | HEIGHT: 64 IN | DIASTOLIC BLOOD PRESSURE: 80 MMHG | WEIGHT: 235 LBS | SYSTOLIC BLOOD PRESSURE: 124 MMHG

## 2025-05-21 VITALS — OXYGEN SATURATION: 98 % | HEART RATE: 71 BPM

## 2025-05-21 DIAGNOSIS — Z01.810 ENCOUNTER FOR PREPROCEDURAL CARDIOVASCULAR EXAMINATION: ICD-10-CM

## 2025-05-21 DIAGNOSIS — I26.99 OTHER PULMONARY EMBOLISM W/OUT ACUTE COR PULMONALE: ICD-10-CM

## 2025-05-21 DIAGNOSIS — Z95.5 PRESENCE OF CORONARY ANGIOPLASTY IMPLANT AND GRAFT: ICD-10-CM

## 2025-05-21 DIAGNOSIS — E78.5 HYPERLIPIDEMIA, UNSPECIFIED: ICD-10-CM

## 2025-05-21 DIAGNOSIS — I10 ESSENTIAL (PRIMARY) HYPERTENSION: ICD-10-CM

## 2025-05-21 PROCEDURE — 99214 OFFICE O/P EST MOD 30 MIN: CPT

## 2025-05-21 RX ORDER — PANTOPRAZOLE SODIUM 20 MG/1
TABLET, DELAYED RELEASE ORAL
Refills: 0 | Status: ACTIVE | COMMUNITY

## 2025-05-21 RX ORDER — CLOPIDOGREL 75 MG/1
75 TABLET, FILM COATED ORAL
Refills: 0 | Status: ACTIVE | COMMUNITY

## 2025-05-21 RX ORDER — ELECTROLYTES/DEXTROSE
SOLUTION, ORAL ORAL
Refills: 0 | Status: ACTIVE | COMMUNITY

## 2025-05-21 RX ORDER — SEMAGLUTIDE 0.68 MG/ML
INJECTION, SOLUTION SUBCUTANEOUS
Refills: 0 | Status: ACTIVE | COMMUNITY

## 2025-06-09 ENCOUNTER — APPOINTMENT (OUTPATIENT)
Dept: ORTHOPEDIC SURGERY | Facility: CLINIC | Age: 75
End: 2025-06-09
Payer: MEDICARE

## 2025-06-09 VITALS — BODY MASS INDEX: 40.12 KG/M2 | WEIGHT: 235 LBS | HEIGHT: 64 IN

## 2025-06-09 PROCEDURE — 99213 OFFICE O/P EST LOW 20 MIN: CPT

## 2025-06-18 PROBLEM — H26.493 POSTERIOR CAPSULAR OPACIFICATION; BOTH EYES: Status: ACTIVE | Noted: 2025-06-18

## 2025-07-03 ENCOUNTER — NON-APPOINTMENT (OUTPATIENT)
Age: 75
End: 2025-07-03

## 2025-07-22 ENCOUNTER — OFFICE (OUTPATIENT)
Dept: URBAN - METROPOLITAN AREA CLINIC 38 | Facility: CLINIC | Age: 75
Setting detail: OPHTHALMOLOGY
End: 2025-07-22
Payer: MEDICARE

## 2025-07-22 DIAGNOSIS — H02.89: ICD-10-CM

## 2025-07-22 DIAGNOSIS — H90.3: ICD-10-CM

## 2025-07-22 DIAGNOSIS — B02.1: ICD-10-CM

## 2025-07-22 PROCEDURE — 92285 EXTERNAL OCULAR PHOTOGRAPHY: CPT | Performed by: OPHTHALMOLOGY

## 2025-07-22 PROCEDURE — 99213 OFFICE O/P EST LOW 20 MIN: CPT | Performed by: OPHTHALMOLOGY

## 2025-07-22 PROCEDURE — 92550 TYMPANOMETRY & REFLEX THRESH: CPT | Performed by: AUDIOLOGIST

## 2025-07-22 PROCEDURE — 92557 COMPREHENSIVE HEARING TEST: CPT | Performed by: AUDIOLOGIST

## 2025-07-22 ASSESSMENT — SUPERFICIAL PUNCTATE KERATITIS (SPK)
OD_SPK: 1+
OS_SPK: 1+

## 2025-07-22 ASSESSMENT — REFRACTION_CURRENTRX
OS_ADD: +2.50
OD_OVR_VA: 20/
OD_VPRISM_DIRECTION: PROGS
OD_ADD: +2.50
OS_AXIS: 077
OD_OVR_VA: 20/
OS_SPHERE: +1.25
OS_AXIS: 077
OD_SPHERE: +0.75
OD_CYLINDER: -0.75
OS_OVR_VA: 20/
OS_CYLINDER: -1.75
OD_AXIS: 085
OS_VPRISM_DIRECTION: SV
OS_ADD: +2.50
OD_AXIS: 091
OD_SPHERE: +0.75
OD_CYLINDER: -0.75
OS_OVR_VA: 20/
OS_SPHERE: +1.25
OD_VPRISM_DIRECTION: SV
OS_CYLINDER: -1.75
OS_VPRISM_DIRECTION: PROGS
OD_ADD: +2.50

## 2025-07-22 ASSESSMENT — KERATOMETRY
OD_K1POWER_DIOPTERS: 42.25
OS_K2POWER_DIOPTERS: 41.75
OD_K2POWER_DIOPTERS: 42.75
OS_K1POWER_DIOPTERS: 41.75
OD_AXISANGLE_DEGREES: 153

## 2025-07-22 ASSESSMENT — REFRACTION_MANIFEST
OD_CYLINDER: -0.50
OS_CYLINDER: -0.75
OS_AXIS: 070
OS_AXIS: 089
OS_ADD: +2.50
OD_AXIS: 090
OS_CYLINDER: -2.00
OS_ADD: +2.50
OS_VA1: 20/25
OS_SPHERE: +1.00
OD_VA1: 20/30
OD_SPHERE: +0.50
OD_CYLINDER: -0.75
OD_AXIS: 080
OS_SPHERE: +1.50
OD_ADD: +2.50
OD_SPHERE: +0.75
OD_ADD: +2.50

## 2025-07-22 ASSESSMENT — REFRACTION_AUTOREFRACTION
OD_AXIS: 096
OS_CYLINDER: -1.50
OS_AXIS: 080
OD_CYLINDER: -1.00
OS_SPHERE: +1.25
OD_SPHERE: +0.75

## 2025-07-22 ASSESSMENT — LID EXAM ASSESSMENTS
OD_MEIBOMITIS: RUL 2+
OD_EDEMA: RUL 1+

## 2025-07-22 ASSESSMENT — LID POSITION - DERMATOCHALASIS
OS_DERMATOCHALASIS: LUL 1+
OD_DERMATOCHALASIS: RUL 2+ 3+

## 2025-07-22 ASSESSMENT — VISUAL ACUITY
OD_BCVA: 20/20
OS_BCVA: 20/40-

## 2025-07-22 ASSESSMENT — CONFRONTATIONAL VISUAL FIELD TEST (CVF)
OS_FINDINGS: FULL
OD_FINDINGS: FULL

## 2025-07-30 ENCOUNTER — OFFICE (OUTPATIENT)
Dept: URBAN - METROPOLITAN AREA CLINIC 103 | Facility: CLINIC | Age: 75
Setting detail: OPHTHALMOLOGY
End: 2025-07-30
Payer: MEDICARE

## 2025-07-30 DIAGNOSIS — H02.89: ICD-10-CM

## 2025-07-30 DIAGNOSIS — B02.1: ICD-10-CM

## 2025-07-30 PROBLEM — H16.222 DRY EYE SYNDROME K SICCA;  , LEFT EYE: Status: ACTIVE | Noted: 2025-07-30

## 2025-07-30 PROCEDURE — 92012 INTRM OPH EXAM EST PATIENT: CPT | Performed by: OPHTHALMOLOGY

## 2025-07-30 ASSESSMENT — REFRACTION_MANIFEST
OS_ADD: +2.50
OS_ADD: +2.50
OD_AXIS: 090
OD_SPHERE: +0.75
OD_SPHERE: +0.50
OS_AXIS: 089
OD_CYLINDER: -0.75
OS_VA1: 20/25
OD_VA1: 20/30
OS_CYLINDER: -2.00
OS_SPHERE: +1.50
OD_ADD: +2.50
OD_AXIS: 080
OD_CYLINDER: -0.50
OS_SPHERE: +1.00
OS_AXIS: 070
OD_ADD: +2.50
OS_CYLINDER: -0.75

## 2025-07-30 ASSESSMENT — REFRACTION_CURRENTRX
OD_SPHERE: +0.75
OS_OVR_VA: 20/
OD_ADD: +2.50
OS_OVR_VA: 20/
OS_SPHERE: +1.25
OD_AXIS: 085
OS_VPRISM_DIRECTION: SV
OS_SPHERE: +1.25
OS_CYLINDER: -1.75
OS_ADD: +2.50
OD_ADD: +2.50
OD_CYLINDER: -0.75
OS_CYLINDER: -1.75
OD_VPRISM_DIRECTION: SV
OS_VPRISM_DIRECTION: PROGS
OD_CYLINDER: -0.75
OS_ADD: +2.50
OD_OVR_VA: 20/
OS_AXIS: 077
OD_OVR_VA: 20/
OS_AXIS: 077
OD_AXIS: 091
OD_VPRISM_DIRECTION: PROGS
OD_SPHERE: +0.75

## 2025-07-30 ASSESSMENT — LID EXAM ASSESSMENTS
OD_EDEMA: RUL 1+
OD_MEIBOMITIS: RUL 2+

## 2025-07-30 ASSESSMENT — KERATOMETRY
OS_K1POWER_DIOPTERS: 41.75
OS_K2POWER_DIOPTERS: 41.75
OD_K2POWER_DIOPTERS: 42.75
OD_AXISANGLE_DEGREES: 153
OD_K1POWER_DIOPTERS: 42.25

## 2025-07-30 ASSESSMENT — VISUAL ACUITY
OS_BCVA: 20/40
OD_BCVA: 20/20-1

## 2025-07-30 ASSESSMENT — CONFRONTATIONAL VISUAL FIELD TEST (CVF)
OD_FINDINGS: FULL
OS_FINDINGS: FULL

## 2025-07-30 ASSESSMENT — LID POSITION - DERMATOCHALASIS
OD_DERMATOCHALASIS: RUL 2+ 3+
OS_DERMATOCHALASIS: LUL 1+

## 2025-07-30 ASSESSMENT — REFRACTION_AUTOREFRACTION
OS_CYLINDER: -1.50
OD_AXIS: 096
OD_CYLINDER: -1.00
OS_AXIS: 080
OS_SPHERE: +1.25
OD_SPHERE: +0.75

## 2025-07-30 ASSESSMENT — SUPERFICIAL PUNCTATE KERATITIS (SPK)
OD_SPK: ABSENT
OS_SPK: 1+

## 2025-08-28 ENCOUNTER — OFFICE (OUTPATIENT)
Dept: URBAN - METROPOLITAN AREA CLINIC 103 | Facility: CLINIC | Age: 75
Setting detail: OPHTHALMOLOGY
End: 2025-08-28
Payer: MEDICARE

## 2025-08-28 DIAGNOSIS — H02.831: ICD-10-CM

## 2025-08-28 DIAGNOSIS — H02.834: ICD-10-CM

## 2025-08-28 DIAGNOSIS — H43.813: ICD-10-CM

## 2025-08-28 DIAGNOSIS — H35.033: ICD-10-CM

## 2025-08-28 DIAGNOSIS — H16.222: ICD-10-CM

## 2025-08-28 DIAGNOSIS — E11.3293: ICD-10-CM

## 2025-08-28 DIAGNOSIS — H52.7: ICD-10-CM

## 2025-08-28 DIAGNOSIS — H35.3111: ICD-10-CM

## 2025-08-28 DIAGNOSIS — H31.101: ICD-10-CM

## 2025-08-28 PROCEDURE — 92014 COMPRE OPH EXAM EST PT 1/>: CPT | Performed by: OPHTHALMOLOGY

## 2025-08-28 PROCEDURE — 92015 DETERMINE REFRACTIVE STATE: CPT | Performed by: OPHTHALMOLOGY

## 2025-08-28 PROCEDURE — 92250 FUNDUS PHOTOGRAPHY W/I&R: CPT | Performed by: OPHTHALMOLOGY

## 2025-08-28 ASSESSMENT — REFRACTION_MANIFEST
OD_CYLINDER: -0.75
OS_ADD: +2.50
OD_AXIS: 080
OD_ADD: +2.50
OD_VA1: 20/30-
OS_CYLINDER: -1.50
OD_CYLINDER: -1.25
OD_ADD: +2.50
OS_SPHERE: +1.50
OS_CYLINDER: -2.00
OS_SPHERE: +1.25
OD_AXIS: 080
OS_VA1: 20/30
OD_SPHERE: +0.75
OS_AXIS: 070
OS_ADD: +2.50
OD_SPHERE: +0.50
OS_AXIS: 075

## 2025-08-28 ASSESSMENT — REFRACTION_CURRENTRX
OS_AXIS: 076
OS_ADD: +2.50
OS_OVR_VA: 20/
OS_SPHERE: +1.50
OD_CYLINDER: -0.75
OS_SPHERE: +1.25
OS_VPRISM_DIRECTION: PROGS
OD_AXIS: 082
OD_AXIS: 091
OD_ADD: +2.50
OD_CYLINDER: -0.75
OD_OVR_VA: 20/
OS_VPRISM_DIRECTION: SV
OD_SPHERE: +0.75
OD_ADD: +2.50
OD_SPHERE: +0.75
OS_AXIS: 077
OS_CYLINDER: -1.75
OD_OVR_VA: 20/
OS_OVR_VA: 20/
OS_CYLINDER: -1.75
OD_VPRISM_DIRECTION: SV
OD_VPRISM_DIRECTION: PROGS
OS_ADD: +2.50

## 2025-08-28 ASSESSMENT — KERATOMETRY
OS_AXISANGLE_DEGREES: 000
OD_AXISANGLE_DEGREES: 164
OS_K2POWER_DIOPTERS: 42.25
OD_K1POWER_DIOPTERS: 41.75
OS_K1POWER_DIOPTERS: 42.25
OD_K2POWER_DIOPTERS: 42.25

## 2025-08-28 ASSESSMENT — LID POSITION - DERMATOCHALASIS
OS_DERMATOCHALASIS: LUL 1+
OD_DERMATOCHALASIS: RUL 2+ 3+

## 2025-08-28 ASSESSMENT — LID EXAM ASSESSMENTS
OD_EDEMA: RUL 1+
OD_MEIBOMITIS: RUL 2+

## 2025-08-28 ASSESSMENT — VISUAL ACUITY
OD_BCVA: 20/30
OS_BCVA: 20/40-1

## 2025-08-28 ASSESSMENT — CONFRONTATIONAL VISUAL FIELD TEST (CVF)
OS_FINDINGS: FULL
OD_FINDINGS: FULL

## 2025-08-28 ASSESSMENT — REFRACTION_AUTOREFRACTION
OD_SPHERE: +1.00
OD_CYLINDER: -1.25
OS_CYLINDER: -1.50
OS_AXIS: 083
OS_SPHERE: +1.25
OD_AXIS: 098

## 2025-08-28 ASSESSMENT — TONOMETRY
OD_IOP_MMHG: 09
OS_IOP_MMHG: 10

## 2025-08-28 ASSESSMENT — SUPERFICIAL PUNCTATE KERATITIS (SPK)
OD_SPK: ABSENT
OS_SPK: 1+

## 2025-09-15 ENCOUNTER — NON-APPOINTMENT (OUTPATIENT)
Age: 75
End: 2025-09-15

## 2025-09-16 ENCOUNTER — NON-APPOINTMENT (OUTPATIENT)
Age: 75
End: 2025-09-16

## 2025-09-17 ENCOUNTER — NON-APPOINTMENT (OUTPATIENT)
Age: 75
End: 2025-09-17

## 2025-09-17 ENCOUNTER — APPOINTMENT (OUTPATIENT)
Facility: CLINIC | Age: 75
End: 2025-09-17
Payer: MEDICARE

## 2025-09-17 VITALS
SYSTOLIC BLOOD PRESSURE: 108 MMHG | HEIGHT: 64 IN | DIASTOLIC BLOOD PRESSURE: 68 MMHG | WEIGHT: 228 LBS | HEART RATE: 98 BPM | OXYGEN SATURATION: 96 % | BODY MASS INDEX: 38.93 KG/M2

## 2025-09-17 DIAGNOSIS — N18.9 CHRONIC KIDNEY DISEASE, UNSPECIFIED: ICD-10-CM

## 2025-09-17 DIAGNOSIS — E11.9 TYPE 2 DIABETES MELLITUS W/OUT COMPLICATIONS: ICD-10-CM

## 2025-09-17 DIAGNOSIS — E66.9 OBESITY, UNSPECIFIED: ICD-10-CM

## 2025-09-17 PROCEDURE — 99214 OFFICE O/P EST MOD 30 MIN: CPT | Mod: 25

## 2025-09-17 PROCEDURE — 93000 ELECTROCARDIOGRAM COMPLETE: CPT | Mod: NC,25

## 2025-09-18 ENCOUNTER — APPOINTMENT (OUTPATIENT)
Dept: ORTHOPEDIC SURGERY | Facility: CLINIC | Age: 75
End: 2025-09-18